# Patient Record
Sex: FEMALE | Race: WHITE | NOT HISPANIC OR LATINO
[De-identification: names, ages, dates, MRNs, and addresses within clinical notes are randomized per-mention and may not be internally consistent; named-entity substitution may affect disease eponyms.]

---

## 2024-03-14 ENCOUNTER — RX ONLY (OUTPATIENT)
Age: 74
Setting detail: RX ONLY
End: 2024-03-14

## 2024-03-14 ENCOUNTER — APPOINTMENT (RX ONLY)
Dept: URBAN - METROPOLITAN AREA CLINIC 144 | Facility: CLINIC | Age: 74
Setting detail: DERMATOLOGY
End: 2024-03-14

## 2024-03-14 DIAGNOSIS — Z41.9 ENCOUNTER FOR PROCEDURE FOR PURPOSES OTHER THAN REMEDYING HEALTH STATE, UNSPECIFIED: ICD-10-CM

## 2024-03-14 PROCEDURE — ? COSMETIC CONSULTATION: BOTOX

## 2024-03-14 PROCEDURE — ? COSMETIC CONSULTATION: GENERAL

## 2024-03-14 RX ORDER — VALACYCLOVIR HYDROCHLORIDE 1 G/1
TABLET, FILM COATED ORAL
Qty: 18 | Refills: 0 | Status: ERX | COMMUNITY
Start: 2024-03-14

## 2024-03-26 ENCOUNTER — APPOINTMENT (RX ONLY)
Dept: URBAN - METROPOLITAN AREA CLINIC 146 | Facility: CLINIC | Age: 74
Setting detail: DERMATOLOGY
End: 2024-03-26

## 2024-03-26 DIAGNOSIS — Z41.9 ENCOUNTER FOR PROCEDURE FOR PURPOSES OTHER THAN REMEDYING HEALTH STATE, UNSPECIFIED: ICD-10-CM

## 2024-03-26 PROCEDURE — ? FILLERS

## 2024-03-26 NOTE — PROCEDURE: FILLERS
Additional Area 1 Volume In Cc: 0
Include Cannula Information In Note?: No
Anesthesia Volume In Cc: 0.5
Anesthesia Type: 1% lidocaine with epinephrine
Number Of Syringes (Required For Inventory): 1
Additional Anesthesia Volume In Cc: 6
Inventory Information: This plan will send filler information to inventory based on the fillers you select. Multiple fillers can be sent but you must ensure you select the appropriate fillers in the inventory tab.
Consent: Written consent obtained. Risks include but not limited to bruising, beading, irregular texture, ulceration, infection, allergic reaction, scar formation, incomplete augmentation, temporary nature, procedural pain.
Topical Anesthesia?: 12% lidocaine, 12% tetracaine
Number Of Syringes (Required For Inventory): 4
Detail Level: Zone
Post-Care Instructions: Patient instructed to apply ice to reduce swelling. Apply Dermaka cream for bruising. Patient may cover at bed time.
Map Statment: See Attach Map for Complete Details
Filler: Radiesse
Show Inventory Tab: Show

## 2024-04-11 ENCOUNTER — APPOINTMENT (RX ONLY)
Dept: URBAN - METROPOLITAN AREA CLINIC 144 | Facility: CLINIC | Age: 74
Setting detail: DERMATOLOGY
End: 2024-04-11

## 2024-04-11 DIAGNOSIS — Z41.9 ENCOUNTER FOR PROCEDURE FOR PURPOSES OTHER THAN REMEDYING HEALTH STATE, UNSPECIFIED: ICD-10-CM

## 2024-04-11 PROCEDURE — ? COSMETIC CONSULTATION: BOTOX

## 2024-04-11 PROCEDURE — ? COSMETIC FOLLOW-UP

## 2024-04-11 PROCEDURE — ? BOTOX

## 2024-04-11 NOTE — PROCEDURE: BOTOX
Total Units: 0
Consent: Written consent obtained. Risks include but not limited to lid/brow ptosis, bruising, swelling, diplopia, temporary effect, incomplete chemical denervation.
Show Inventory Tab: Show
Detail Level: Detailed
Map Statement: Please see attached map for locations and injection amounts.\\n\\nDAO 16,L8\\nTotal units used 24\\n\\n$12 unit pricing due to office reschedule
Post-Care Instructions: Patient instructed to not lie down for 4 hours and limit physical activity for 24 hours.

## 2024-04-11 NOTE — PROCEDURE: COSMETIC FOLLOW-UP
Side Effects Or Complications: None
Comments (Free Text): Follow up from filler on 3/28 Radiesse x4\\nPatient is concerned about the perioral area and the left cheek hollowness.\\nReviewed before and afters with patient to show patient that there was always unevenness and hollowness on left side, but it has gotten better. Also explained to patient that she did not receive full recommended treatment, patient understands, states that she wanted to take it slow since this was her first time. \\nRecommending Botox to help with unevenness
Global Improvement: Very Good
Detail Level: Zone
Patient Satisfaction: Pleased
Treatment (Optional): Filler Injection